# Patient Record
Sex: MALE | Race: WHITE
[De-identification: names, ages, dates, MRNs, and addresses within clinical notes are randomized per-mention and may not be internally consistent; named-entity substitution may affect disease eponyms.]

---

## 2021-07-02 ENCOUNTER — HOSPITAL ENCOUNTER (OUTPATIENT)
Dept: HOSPITAL 63 - RAD | Age: 46
End: 2021-07-02
Attending: PHYSICIAN ASSISTANT
Payer: COMMERCIAL

## 2021-07-02 DIAGNOSIS — M79.671: Primary | ICD-10-CM

## 2021-07-02 PROCEDURE — 73630 X-RAY EXAM OF FOOT: CPT

## 2021-07-02 NOTE — RAD
EXAMINATION: Right foot radiograph.



VIEWS: 3



COMPARISON: None



INDICATION:46 years, Male, foot pain, swelling and redness for 6 days.



FINDINGS: 

No acute fracture, dislocation or subluxation. No bone erosion or periosteal reaction. No soft tissue
 swelling or joint effusion.



IMPRESSION:

No acute osseous process.



Electronically signed by: Kesha Banda MD (7/2/2021 9:35 AM) [id number]